# Patient Record
Sex: FEMALE | Race: WHITE | NOT HISPANIC OR LATINO | URBAN - METROPOLITAN AREA
[De-identification: names, ages, dates, MRNs, and addresses within clinical notes are randomized per-mention and may not be internally consistent; named-entity substitution may affect disease eponyms.]

---

## 2020-07-25 ENCOUNTER — TELEPHONE ENCOUNTER (OUTPATIENT)
Dept: URBAN - METROPOLITAN AREA CLINIC 13 | Facility: CLINIC | Age: 35
End: 2020-07-25

## 2020-07-25 RX ORDER — PANTOPRAZOLE SODIUM 40 MG/1
TAKE ONE TABLET BY MOUTH ONCE DAILY TABLET, DELAYED RELEASE ORAL
Qty: 30 | Refills: 3 | OUTPATIENT
Start: 2017-08-01 | End: 2018-03-21

## 2020-07-25 RX ORDER — HYOSCYAMINE SULFATE 0.12 MG/1
DISSOLVE 1 TABLET UNDER THE TONGUE EVERY 4 TO 6 HOURS AS NEEDED FOR ABDOMINAL PAIN TABLET, ORALLY DISINTEGRATING ORAL
Qty: 30 | Refills: 2 | OUTPATIENT
Start: 2017-07-21 | End: 2018-03-21

## 2020-07-25 RX ORDER — NORETHINDRONE ACETATE AND ETHINYL ESTRADIOL 1.5; 3 MG/1; UG/1
TAKE 1 TABLET DAILY AS DIRECTED TABLET ORAL
Refills: 0 | OUTPATIENT
Start: 2017-06-12 | End: 2017-07-21

## 2020-07-25 RX ORDER — NORETHINDRONE ACETATE, ETHINYL ESTRADIOL AND FERROUS FUMARATE 1MG-20(24)
TAKE 1 TABLET DAILY KIT ORAL
Refills: 0 | OUTPATIENT
End: 2018-03-21

## 2020-07-25 RX ORDER — BUSPIRONE HYDROCHLORIDE 5 MG/1
TAKE 1 TABLET TWICE DAILY TABLET ORAL
Qty: 60 | Refills: 11 | OUTPATIENT
Start: 2017-07-21 | End: 2017-08-04

## 2020-07-25 RX ORDER — POLYETHYLENE GLYCOL 3350, SODIUM SULFATE, SODIUM CHLORIDE, POTASSIUM CHLORIDE, ASCORBIC ACID, SODIUM ASCORBATE 7.5-2.691G
DRINK FIRST LITER OF SOLUTION AT 5:00 PM THE DAY PRIOR TO PROCEDURE. DRINK SECOND LITER 6 HOURS PRIOR TO PROCEDURE KIT ORAL
Qty: 1 | Refills: 0 | OUTPATIENT
Start: 2017-06-12 | End: 2017-07-07

## 2020-07-25 RX ORDER — ONDANSETRON HYDROCHLORIDE 4 MG/1
1 TABLET EVERY 4 HOURS AS NEEDED FOR NAUSEA TABLET, FILM COATED ORAL
Qty: 60 | Refills: 1 | OUTPATIENT
Start: 2017-07-21 | End: 2018-03-21

## 2020-07-25 RX ORDER — AMITRIPTYLINE HYDROCHLORIDE 10 MG/1
TAKE 1 TABLET BY MOUTH AT BEDTIME TABLET, FILM COATED ORAL
Qty: 30 | Refills: 5 | OUTPATIENT
Start: 2018-03-21 | End: 2019-05-21

## 2020-07-26 ENCOUNTER — TELEPHONE ENCOUNTER (OUTPATIENT)
Dept: URBAN - METROPOLITAN AREA CLINIC 13 | Facility: CLINIC | Age: 35
End: 2020-07-26

## 2020-07-26 RX ORDER — CEPHALEXIN 500 MG/1
TAKE ONE CAPSULE EVERY 6 HOURS FOR 10 DAYS CAPSULE ORAL
Qty: 40 | Refills: 0 | Status: ACTIVE | COMMUNITY
Start: 2018-09-10

## 2020-07-26 RX ORDER — BACLOFEN 10 MG/1
TAKE 1 TABLET BY MOUTH THREE TIMES A DAY AS NEEDED TABLET ORAL
Qty: 90 | Refills: 0 | Status: ACTIVE | COMMUNITY
Start: 2018-09-06

## 2020-07-26 RX ORDER — IPRATROPIUM BROMIDE 21 UG/1
USE 2 SPRAYS IN EACH NOSTRIL TWICE DAILY SPRAY, METERED NASAL
Refills: 0 | Status: ACTIVE | COMMUNITY
Start: 2019-05-10

## 2020-07-26 RX ORDER — POLYETHYLENE GLYCOL-3350 AND ELECTROLYTES 236; 6.74; 5.86; 2.97; 22.74 G/274.31G; G/274.31G; G/274.31G; G/274.31G; G/274.31G
POWDER, FOR SOLUTION ORAL
Qty: 4000 | Refills: 0 | Status: ACTIVE | COMMUNITY
Start: 2019-03-22

## 2020-07-26 RX ORDER — ALPRAZOLAM 0.5 MG/1
TAKE 1 TABLET NIGHTLY TABLET ORAL
Qty: 20 | Refills: 0 | Status: ACTIVE | COMMUNITY
Start: 2018-11-05

## 2020-07-26 RX ORDER — NORETHINDRONE ACETATE AND ETHINYL ESTRADIOL 1; 20 MG/1; UG/1
TAKE 1 TABLET DAILY AS DIRECTED TABLET ORAL
Refills: 0 | Status: ACTIVE | COMMUNITY

## 2020-07-26 RX ORDER — OXYCODONE AND ACETAMINOPHEN 5; 325 MG/1; MG/1
TAKE 1 TABLET BY MOUTH EVERY 4 TO 6 HOURS AS NEEDED FOR 14 DAYS TABLET ORAL
Qty: 84 | Refills: 0 | Status: ACTIVE | COMMUNITY
Start: 2018-09-17

## 2020-07-26 RX ORDER — DOXYCYCLINE 100 MG/1
CAPSULE ORAL
Qty: 20 | Refills: 0 | Status: ACTIVE | COMMUNITY
Start: 2019-01-28

## 2020-07-26 RX ORDER — FLUCONAZOLE 150 MG/1
TABLET ORAL
Qty: 2 | Refills: 0 | Status: ACTIVE | COMMUNITY
Start: 2019-01-28

## 2020-07-26 RX ORDER — METHYLPREDNISOLONE 4 MG/1
TABLET ORAL
Qty: 21 | Refills: 0 | Status: ACTIVE | COMMUNITY
Start: 2019-02-10

## 2020-07-26 RX ORDER — PROMETHAZINE HYDROCHLORIDE 12.5 MG/1
TAKE ONE TABLET EVERY 6 HOURS AS NEEDED NAUSEA/VOMITING TABLET ORAL
Qty: 10 | Refills: 0 | Status: ACTIVE | COMMUNITY
Start: 2018-09-06

## 2020-07-26 RX ORDER — CARISOPRODOL 350 MG/1
TABLET ORAL
Qty: 90 | Refills: 0 | Status: ACTIVE | COMMUNITY
Start: 2019-01-11

## 2020-07-26 RX ORDER — PREDNISONE 50 MG/1
TABLET ORAL
Qty: 3 | Refills: 0 | Status: ACTIVE | COMMUNITY
Start: 2019-03-22

## 2025-04-02 ENCOUNTER — OFFICE VISIT (OUTPATIENT)
Dept: URBAN - METROPOLITAN AREA CLINIC 113 | Facility: CLINIC | Age: 40
End: 2025-04-02
Payer: COMMERCIAL

## 2025-04-02 ENCOUNTER — DASHBOARD ENCOUNTERS (OUTPATIENT)
Age: 40
End: 2025-04-02

## 2025-04-02 DIAGNOSIS — R10.32 LLQ PAIN: ICD-10-CM

## 2025-04-02 DIAGNOSIS — Z86.0100 HISTORY OF COLON POLYPS: ICD-10-CM

## 2025-04-02 DIAGNOSIS — K64.8 INTERNAL HEMORRHOIDS: ICD-10-CM

## 2025-04-02 DIAGNOSIS — R19.7 CHRONIC DIARRHEA: ICD-10-CM

## 2025-04-02 PROCEDURE — 99204 OFFICE O/P NEW MOD 45 MIN: CPT

## 2025-04-02 PROCEDURE — 99244 OFF/OP CNSLTJ NEW/EST MOD 40: CPT

## 2025-04-02 RX ORDER — OXYCODONE AND ACETAMINOPHEN 5; 325 MG/1; MG/1
TAKE 1 TABLET BY MOUTH EVERY 4 TO 6 HOURS AS NEEDED FOR 14 DAYS TABLET ORAL
Qty: 84 | Refills: 0 | Status: ON HOLD | COMMUNITY
Start: 2018-09-17

## 2025-04-02 RX ORDER — NORETHINDRONE ACETATE AND ETHINYL ESTRADIOL 1; 20 MG/1; UG/1
TAKE 1 TABLET DAILY AS DIRECTED TABLET ORAL
Refills: 0 | Status: ON HOLD | COMMUNITY

## 2025-04-02 RX ORDER — BACLOFEN 10 MG/1
TAKE 1 TABLET BY MOUTH THREE TIMES A DAY AS NEEDED TABLET ORAL
Qty: 90 | Refills: 0 | Status: ON HOLD | COMMUNITY
Start: 2018-09-06

## 2025-04-02 RX ORDER — HYDROCORTISONE ACETATE 25 MG/1
1 SUPPOSITORY SUPPOSITORY RECTAL TWICE A DAY
Qty: 30 | Refills: 0 | OUTPATIENT
Start: 2025-04-02 | End: 2025-04-16

## 2025-04-02 RX ORDER — DICYCLOMINE HYDROCHLORIDE 10 MG/1
1 CAPSULE CAPSULE ORAL
Qty: 90 | Refills: 1 | OUTPATIENT
Start: 2025-04-02

## 2025-04-02 RX ORDER — PREDNISONE 50 MG/1
TABLET ORAL
Qty: 3 | Refills: 0 | Status: ON HOLD | COMMUNITY
Start: 2019-03-22

## 2025-04-02 RX ORDER — PROMETHAZINE HYDROCHLORIDE 12.5 MG/1
TAKE ONE TABLET EVERY 6 HOURS AS NEEDED NAUSEA/VOMITING TABLET ORAL
Qty: 10 | Refills: 0 | Status: ON HOLD | COMMUNITY
Start: 2018-09-06

## 2025-04-02 RX ORDER — ALPRAZOLAM 0.5 MG/1
TAKE 1 TABLET NIGHTLY TABLET ORAL
Qty: 20 | Refills: 0 | Status: ON HOLD | COMMUNITY
Start: 2018-11-05

## 2025-04-02 RX ORDER — CEPHALEXIN 500 MG/1
TAKE ONE CAPSULE EVERY 6 HOURS FOR 10 DAYS CAPSULE ORAL
Qty: 40 | Refills: 0 | Status: ON HOLD | COMMUNITY
Start: 2018-09-10

## 2025-04-02 RX ORDER — COLESTIPOL HYDROCHLORIDE 1 G/1
1 TABLET TABLET, FILM COATED ORAL TWICE DAILY
Qty: 180 | Refills: 2 | OUTPATIENT
Start: 2025-04-02

## 2025-04-02 RX ORDER — POLYETHYLENE GLYCOL-3350 AND ELECTROLYTES 236; 6.74; 5.86; 2.97; 22.74 G/274.31G; G/274.31G; G/274.31G; G/274.31G; G/274.31G
POWDER, FOR SOLUTION ORAL
Qty: 4000 | Refills: 0 | Status: ON HOLD | COMMUNITY
Start: 2019-03-22

## 2025-04-02 RX ORDER — PREDNISONE 50 MG/1
AS DIRECTED TABLET ORAL ONCE
Qty: 3 | Refills: 0 | OUTPATIENT
Start: 2025-04-02

## 2025-04-02 RX ORDER — METHYLPREDNISOLONE 4 MG/1
TABLET ORAL
Qty: 21 | Refills: 0 | Status: ON HOLD | COMMUNITY
Start: 2019-02-10

## 2025-04-02 RX ORDER — FLUCONAZOLE 150 MG/1
TABLET ORAL
Qty: 2 | Refills: 0 | Status: ON HOLD | COMMUNITY
Start: 2019-01-28

## 2025-04-02 RX ORDER — IPRATROPIUM BROMIDE 21 UG/1
USE 2 SPRAYS IN EACH NOSTRIL TWICE DAILY SPRAY, METERED NASAL
Refills: 0 | Status: ON HOLD | COMMUNITY
Start: 2019-05-10

## 2025-04-02 RX ORDER — DIPHENHYDRAMINE HYDROCHLORIDE 50 MG/1
1 CAPSULE CAPSULE ORAL ONCE
Qty: 1 | Refills: 0 | OUTPATIENT
Start: 2025-04-02 | End: 2025-04-03

## 2025-04-02 RX ORDER — CARISOPRODOL 350 MG/1
TABLET ORAL
Qty: 90 | Refills: 0 | Status: ON HOLD | COMMUNITY
Start: 2019-01-11

## 2025-04-02 RX ORDER — DOXYCYCLINE 100 MG/1
CAPSULE ORAL
Qty: 20 | Refills: 0 | Status: ON HOLD | COMMUNITY
Start: 2019-01-28

## 2025-04-02 NOTE — HPI-TODAY'S VISIT:
39-year-old female is referred by Dr. Christina Jerry for colonoscopy consultation and a history of ulcerative colitis.  A copy of today's visit will be forwarded to the referring provider. Labs done in October 2024 showed normal H/H, normal LFTs, normal vitamin D, normal T4, normal SALLY, negative RF, normal CRP. She is known to Dr. Spangler for a reported history of left-sided ulcerative colitis.  She underwent a colonoscopy in July 2017 which was completely normal.  Random colon biopsies were unremarkable.  She had chronic diarrhea likely functional due to her component abdominal pain.  She was recommended a trial of amitriptyline with consideration of Questran.  She also had a history of GERD, functional dyspepsia and nausea for which she was to continue pantoprazole 40 mg daily.  She was started on nifedipine/lidocaine ointment for her internal hemorrhoids and anal fissure along with a fiber supplement.  She was recommended hemorrhoid banding as well.  This was completed in 2019.  She recently moved back to [place]. She has had multiple back surgeries. She is a  and  and cannot currently work due to her back.   She states the hemorrhoid banding was very painful. A few years after this she had a pelvic and ovarian vein embolization from pelvic congestion syndrome. She was told it was a severe case. In 2018, she had a repeat colonoscopy in [place]. A polyp was removed. She was working in 2019 and seeing [facility]. She had another colonoscopy which was normal. She was told to repeat in 2024 for polyp surveillance. She reiterates that she was dx with UC as a teenager. Last October she stopped OCPs she lost a lot of weight since then and her GI symptoms have somewhat improved. She does have LLQ pain. She broke her tailbone this January and had severe constipation from pain meds and her hemorrhoid flared. SHe has tried OTC therapies for her hemorrhoids. Fiber does not work.

## 2025-04-17 ENCOUNTER — TELEPHONE ENCOUNTER (OUTPATIENT)
Dept: URBAN - METROPOLITAN AREA CLINIC 113 | Facility: CLINIC | Age: 40
End: 2025-04-17

## 2025-04-29 ENCOUNTER — ERX REFILL RESPONSE (OUTPATIENT)
Dept: URBAN - METROPOLITAN AREA CLINIC 113 | Facility: CLINIC | Age: 40
End: 2025-04-29

## 2025-04-29 RX ORDER — DICYCLOMINE HYDROCHLORIDE 10 MG/1
1 CAPSULE CAPSULE ORAL
Qty: 270 | Refills: 1 | OUTPATIENT

## 2025-04-29 RX ORDER — DICYCLOMINE HYDROCHLORIDE 10 MG/1
1 CAPSULE CAPSULE ORAL
Qty: 90 | Refills: 1 | OUTPATIENT

## 2025-06-02 ENCOUNTER — OFFICE VISIT (OUTPATIENT)
Dept: URBAN - METROPOLITAN AREA CLINIC 113 | Facility: CLINIC | Age: 40
End: 2025-06-02
Payer: COMMERCIAL

## 2025-06-02 DIAGNOSIS — Z86.0100 HISTORY OF COLON POLYPS: ICD-10-CM

## 2025-06-02 DIAGNOSIS — R10.32 LLQ PAIN: ICD-10-CM

## 2025-06-02 DIAGNOSIS — R19.7 ACUTE DIARRHEA: ICD-10-CM

## 2025-06-02 DIAGNOSIS — R63.4 WEIGHT LOSS: ICD-10-CM

## 2025-06-02 DIAGNOSIS — K64.8 INTERNAL HEMORRHOIDS: ICD-10-CM

## 2025-06-02 PROCEDURE — 99214 OFFICE O/P EST MOD 30 MIN: CPT

## 2025-06-02 RX ORDER — METHYLPREDNISOLONE 4 MG/1
TABLET ORAL
Qty: 21 | Refills: 0 | Status: ON HOLD | COMMUNITY
Start: 2019-02-10

## 2025-06-02 RX ORDER — NORETHINDRONE ACETATE AND ETHINYL ESTRADIOL 1; 20 MG/1; UG/1
TAKE 1 TABLET DAILY AS DIRECTED TABLET ORAL
Refills: 0 | Status: ON HOLD | COMMUNITY

## 2025-06-02 RX ORDER — CARISOPRODOL 350 MG/1
TABLET ORAL
Qty: 90 | Refills: 0 | Status: ON HOLD | COMMUNITY
Start: 2019-01-11

## 2025-06-02 RX ORDER — DICYCLOMINE HYDROCHLORIDE 10 MG/1
1 CAPSULE CAPSULE ORAL
Qty: 270 | Refills: 1 | Status: ACTIVE | COMMUNITY

## 2025-06-02 RX ORDER — PROMETHAZINE HYDROCHLORIDE 12.5 MG/1
TAKE ONE TABLET EVERY 6 HOURS AS NEEDED NAUSEA/VOMITING TABLET ORAL
Qty: 10 | Refills: 0 | Status: ON HOLD | COMMUNITY
Start: 2018-09-06

## 2025-06-02 RX ORDER — PREDNISONE 50 MG/1
AS DIRECTED TABLET ORAL ONCE
Qty: 3 | Refills: 0 | Status: ACTIVE | COMMUNITY
Start: 2025-04-02

## 2025-06-02 RX ORDER — IPRATROPIUM BROMIDE 21 UG/1
USE 2 SPRAYS IN EACH NOSTRIL TWICE DAILY SPRAY, METERED NASAL
Refills: 0 | Status: ON HOLD | COMMUNITY
Start: 2019-05-10

## 2025-06-02 RX ORDER — BACLOFEN 10 MG/1
TAKE 1 TABLET BY MOUTH THREE TIMES A DAY AS NEEDED TABLET ORAL
Qty: 90 | Refills: 0 | Status: ON HOLD | COMMUNITY
Start: 2018-09-06

## 2025-06-02 RX ORDER — DOXYCYCLINE 100 MG/1
CAPSULE ORAL
Qty: 20 | Refills: 0 | Status: ON HOLD | COMMUNITY
Start: 2019-01-28

## 2025-06-02 RX ORDER — OXYCODONE AND ACETAMINOPHEN 5; 325 MG/1; MG/1
TAKE 1 TABLET BY MOUTH EVERY 4 TO 6 HOURS AS NEEDED FOR 14 DAYS TABLET ORAL
Qty: 84 | Refills: 0 | Status: ON HOLD | COMMUNITY
Start: 2018-09-17

## 2025-06-02 RX ORDER — PREDNISONE 50 MG/1
TABLET ORAL
Qty: 3 | Refills: 0 | Status: ON HOLD | COMMUNITY
Start: 2019-03-22

## 2025-06-02 RX ORDER — FLUCONAZOLE 150 MG/1
TABLET ORAL
Qty: 2 | Refills: 0 | Status: ON HOLD | COMMUNITY
Start: 2019-01-28

## 2025-06-02 RX ORDER — COLESTIPOL HYDROCHLORIDE 1 G/1
1 TABLET TABLET, FILM COATED ORAL TWICE DAILY
Qty: 180 | Refills: 2 | Status: ACTIVE | COMMUNITY
Start: 2025-04-02

## 2025-06-02 RX ORDER — ALPRAZOLAM 0.5 MG/1
TAKE 1 TABLET NIGHTLY TABLET ORAL
Qty: 20 | Refills: 0 | Status: ON HOLD | COMMUNITY
Start: 2018-11-05

## 2025-06-02 RX ORDER — CEPHALEXIN 500 MG/1
TAKE ONE CAPSULE EVERY 6 HOURS FOR 10 DAYS CAPSULE ORAL
Qty: 40 | Refills: 0 | Status: ON HOLD | COMMUNITY
Start: 2018-09-10

## 2025-06-02 RX ORDER — POLYETHYLENE GLYCOL-3350 AND ELECTROLYTES 236; 6.74; 5.86; 2.97; 22.74 G/274.31G; G/274.31G; G/274.31G; G/274.31G; G/274.31G
POWDER, FOR SOLUTION ORAL
Qty: 4000 | Refills: 0 | Status: ON HOLD | COMMUNITY
Start: 2019-03-22

## 2025-06-02 NOTE — HPI-TODAY'S VISIT:
39-year-old female presents for follow-up.  She was last seen on/2/25.  She had a reported history of ulcerative colitis but had had multiple normal colonoscopies.  Her diarrhea was suspected to be due to irritable bowel syndrome.  She was started on colestipol 1 to 2 tablets daily and Bentyl as needed.  She did report left lower quadrant pain.  She was recommended a CT scan and was provided a premed kit due to contrast allergy.  She has a history of internal hemorrhoids status post banding in 2019 which was painful per patient.  She was provided suppositories.  She is due for a surveillance colonoscopy due to history of colon polyps.  This was deferred until her hemorrhoids were under control.  The order was faxed to [facility].  We do not have records for review.  She states the colestipol is hard to swallow. She had a steroid injection, steroids for her CT imaging. She hasnt had much diarrhea since then. The colestipol causes nausea and abdominal pain.   She had her CT at [facility]. She states the GI findings were fine. She is planned for a LE nerve conduction study this week. She will develop diarrhea if she consumes fruits and vegetables. She has had more constipation predominant bowel habits of late. She still endorses that eating causes lower abdominal pain. She feels her LLQ pain is due to her pelvic congestion syndrome.

## 2025-07-28 ENCOUNTER — OFFICE VISIT (OUTPATIENT)
Dept: URBAN - METROPOLITAN AREA SURGERY CENTER 25 | Facility: SURGERY CENTER | Age: 40
End: 2025-07-28

## 2025-07-28 ENCOUNTER — CLAIMS CREATED FROM THE CLAIM WINDOW (OUTPATIENT)
Dept: URBAN - METROPOLITAN AREA CLINIC 4 | Facility: CLINIC | Age: 40
End: 2025-07-28
Payer: COMMERCIAL

## 2025-07-28 DIAGNOSIS — K63.89 OTHER SPECIFIED DISEASES OF INTESTINE: ICD-10-CM

## 2025-07-28 PROCEDURE — 88342 IMHCHEM/IMCYTCHM 1ST ANTB: CPT | Performed by: PATHOLOGY

## 2025-07-28 PROCEDURE — 88313 SPECIAL STAINS GROUP 2: CPT | Performed by: PATHOLOGY

## 2025-07-28 PROCEDURE — 88305 TISSUE EXAM BY PATHOLOGIST: CPT | Performed by: PATHOLOGY

## 2025-07-28 RX ORDER — BACLOFEN 10 MG/1
TAKE 1 TABLET BY MOUTH THREE TIMES A DAY AS NEEDED TABLET ORAL
Qty: 90 | Refills: 0 | Status: ON HOLD | COMMUNITY
Start: 2018-09-06

## 2025-07-28 RX ORDER — NORETHINDRONE ACETATE AND ETHINYL ESTRADIOL 1; 20 MG/1; UG/1
TAKE 1 TABLET DAILY AS DIRECTED TABLET ORAL
Refills: 0 | Status: ON HOLD | COMMUNITY

## 2025-07-28 RX ORDER — PREDNISONE 50 MG/1
TABLET ORAL
Qty: 3 | Refills: 0 | Status: ON HOLD | COMMUNITY
Start: 2019-03-22

## 2025-07-28 RX ORDER — COLESTIPOL HYDROCHLORIDE 1 G/1
1 TABLET TABLET, FILM COATED ORAL TWICE DAILY
Qty: 180 | Refills: 2 | Status: ACTIVE | COMMUNITY
Start: 2025-04-02

## 2025-07-28 RX ORDER — PROMETHAZINE HYDROCHLORIDE 12.5 MG/1
TAKE ONE TABLET EVERY 6 HOURS AS NEEDED NAUSEA/VOMITING TABLET ORAL
Qty: 10 | Refills: 0 | Status: ON HOLD | COMMUNITY
Start: 2018-09-06

## 2025-07-28 RX ORDER — OXYCODONE AND ACETAMINOPHEN 5; 325 MG/1; MG/1
TAKE 1 TABLET BY MOUTH EVERY 4 TO 6 HOURS AS NEEDED FOR 14 DAYS TABLET ORAL
Qty: 84 | Refills: 0 | Status: ON HOLD | COMMUNITY
Start: 2018-09-17

## 2025-07-28 RX ORDER — CEPHALEXIN 500 MG/1
TAKE ONE CAPSULE EVERY 6 HOURS FOR 10 DAYS CAPSULE ORAL
Qty: 40 | Refills: 0 | Status: ON HOLD | COMMUNITY
Start: 2018-09-10

## 2025-07-28 RX ORDER — CARISOPRODOL 350 MG/1
TABLET ORAL
Qty: 90 | Refills: 0 | Status: ON HOLD | COMMUNITY
Start: 2019-01-11

## 2025-07-28 RX ORDER — ALPRAZOLAM 0.5 MG/1
TAKE 1 TABLET NIGHTLY TABLET ORAL
Qty: 20 | Refills: 0 | Status: ON HOLD | COMMUNITY
Start: 2018-11-05

## 2025-07-28 RX ORDER — POLYETHYLENE GLYCOL-3350 AND ELECTROLYTES 236; 6.74; 5.86; 2.97; 22.74 G/274.31G; G/274.31G; G/274.31G; G/274.31G; G/274.31G
POWDER, FOR SOLUTION ORAL
Qty: 4000 | Refills: 0 | Status: ON HOLD | COMMUNITY
Start: 2019-03-22

## 2025-07-28 RX ORDER — DOXYCYCLINE 100 MG/1
CAPSULE ORAL
Qty: 20 | Refills: 0 | Status: ON HOLD | COMMUNITY
Start: 2019-01-28

## 2025-07-28 RX ORDER — PREDNISONE 50 MG/1
AS DIRECTED TABLET ORAL ONCE
Qty: 3 | Refills: 0 | Status: ACTIVE | COMMUNITY
Start: 2025-04-02

## 2025-07-28 RX ORDER — IPRATROPIUM BROMIDE 21 UG/1
USE 2 SPRAYS IN EACH NOSTRIL TWICE DAILY SPRAY, METERED NASAL
Refills: 0 | Status: ON HOLD | COMMUNITY
Start: 2019-05-10

## 2025-07-28 RX ORDER — FLUCONAZOLE 150 MG/1
TABLET ORAL
Qty: 2 | Refills: 0 | Status: ON HOLD | COMMUNITY
Start: 2019-01-28

## 2025-07-28 RX ORDER — METHYLPREDNISOLONE 4 MG/1
TABLET ORAL
Qty: 21 | Refills: 0 | Status: ON HOLD | COMMUNITY
Start: 2019-02-10

## 2025-07-28 RX ORDER — DICYCLOMINE HYDROCHLORIDE 10 MG/1
1 CAPSULE CAPSULE ORAL
Qty: 270 | Refills: 1 | Status: ACTIVE | COMMUNITY

## 2025-08-08 ENCOUNTER — OFFICE VISIT (OUTPATIENT)
Dept: URBAN - METROPOLITAN AREA CLINIC 113 | Facility: CLINIC | Age: 40
End: 2025-08-08
Payer: COMMERCIAL

## 2025-08-08 DIAGNOSIS — R63.4 WEIGHT LOSS: ICD-10-CM

## 2025-08-08 DIAGNOSIS — K64.8 INTERNAL HEMORRHOIDS: ICD-10-CM

## 2025-08-08 DIAGNOSIS — K55.1 MESENTERIC ARTERY STENOSIS: ICD-10-CM

## 2025-08-08 DIAGNOSIS — R19.7 ACUTE DIARRHEA: ICD-10-CM

## 2025-08-08 DIAGNOSIS — R10.32 LLQ PAIN: ICD-10-CM

## 2025-08-08 DIAGNOSIS — Z86.0100 HISTORY OF COLON POLYPS: ICD-10-CM

## 2025-08-08 PROCEDURE — 99213 OFFICE O/P EST LOW 20 MIN: CPT | Performed by: INTERNAL MEDICINE

## 2025-08-08 RX ORDER — ALPRAZOLAM 0.5 MG/1
TAKE 1 TABLET NIGHTLY TABLET ORAL
Qty: 20 | Refills: 0 | Status: ON HOLD | COMMUNITY
Start: 2018-11-05

## 2025-08-08 RX ORDER — NORETHINDRONE ACETATE AND ETHINYL ESTRADIOL 1; 20 MG/1; UG/1
TAKE 1 TABLET DAILY AS DIRECTED TABLET ORAL
Refills: 0 | Status: ON HOLD | COMMUNITY

## 2025-08-08 RX ORDER — CARISOPRODOL 350 MG/1
TABLET ORAL
Qty: 90 | Refills: 0 | Status: ON HOLD | COMMUNITY
Start: 2019-01-11

## 2025-08-08 RX ORDER — FLUCONAZOLE 150 MG/1
TABLET ORAL
Qty: 2 | Refills: 0 | Status: ON HOLD | COMMUNITY
Start: 2019-01-28

## 2025-08-08 RX ORDER — BACLOFEN 10 MG/1
TAKE 1 TABLET BY MOUTH THREE TIMES A DAY AS NEEDED TABLET ORAL
Qty: 90 | Refills: 0 | Status: ON HOLD | COMMUNITY
Start: 2018-09-06

## 2025-08-08 RX ORDER — DICYCLOMINE HYDROCHLORIDE 10 MG/1
1 CAPSULE CAPSULE ORAL
Qty: 270 | Refills: 1 | Status: ON HOLD | COMMUNITY

## 2025-08-08 RX ORDER — DOXYCYCLINE 100 MG/1
CAPSULE ORAL
Qty: 20 | Refills: 0 | Status: ON HOLD | COMMUNITY
Start: 2019-01-28

## 2025-08-08 RX ORDER — CEPHALEXIN 500 MG/1
TAKE ONE CAPSULE EVERY 6 HOURS FOR 10 DAYS CAPSULE ORAL
Qty: 40 | Refills: 0 | Status: ON HOLD | COMMUNITY
Start: 2018-09-10

## 2025-08-08 RX ORDER — PROMETHAZINE HYDROCHLORIDE 12.5 MG/1
TAKE ONE TABLET EVERY 6 HOURS AS NEEDED NAUSEA/VOMITING TABLET ORAL
Qty: 10 | Refills: 0 | Status: ON HOLD | COMMUNITY
Start: 2018-09-06

## 2025-08-08 RX ORDER — PREDNISONE 50 MG/1
AS DIRECTED TABLET ORAL ONCE
Qty: 3 | Refills: 0 | Status: ON HOLD | COMMUNITY
Start: 2025-04-02

## 2025-08-08 RX ORDER — PREDNISONE 50 MG/1
1 TABLET WITH FOOD OR MILK TABLET ORAL ONCE A DAY
Qty: 5 | OUTPATIENT
Start: 2025-08-08

## 2025-08-08 RX ORDER — PREDNISONE 50 MG/1
TABLET ORAL
Qty: 3 | Refills: 0 | Status: ON HOLD | COMMUNITY
Start: 2019-03-22

## 2025-08-08 RX ORDER — OXYCODONE AND ACETAMINOPHEN 5; 325 MG/1; MG/1
TAKE 1 TABLET BY MOUTH EVERY 4 TO 6 HOURS AS NEEDED FOR 14 DAYS TABLET ORAL
Qty: 84 | Refills: 0 | Status: ON HOLD | COMMUNITY
Start: 2018-09-17

## 2025-08-08 RX ORDER — METHYLPREDNISOLONE 4 MG/1
TABLET ORAL
Qty: 21 | Refills: 0 | Status: ON HOLD | COMMUNITY
Start: 2019-02-10

## 2025-08-08 RX ORDER — COLESTIPOL HYDROCHLORIDE 1 G/1
1 TABLET TABLET, FILM COATED ORAL TWICE DAILY
Qty: 180 | Refills: 2 | Status: ON HOLD | COMMUNITY
Start: 2025-04-02

## 2025-08-08 RX ORDER — POLYETHYLENE GLYCOL-3350 AND ELECTROLYTES 236; 6.74; 5.86; 2.97; 22.74 G/274.31G; G/274.31G; G/274.31G; G/274.31G; G/274.31G
POWDER, FOR SOLUTION ORAL
Qty: 4000 | Refills: 0 | Status: ON HOLD | COMMUNITY
Start: 2019-03-22

## 2025-08-08 RX ORDER — IPRATROPIUM BROMIDE 21 UG/1
USE 2 SPRAYS IN EACH NOSTRIL TWICE DAILY SPRAY, METERED NASAL
Refills: 0 | Status: ON HOLD | COMMUNITY
Start: 2019-05-10

## 2025-08-19 ENCOUNTER — OFFICE VISIT (OUTPATIENT)
Dept: URBAN - METROPOLITAN AREA CLINIC 113 | Facility: CLINIC | Age: 40
End: 2025-08-19

## 2025-08-25 ENCOUNTER — OFFICE VISIT (OUTPATIENT)
Dept: URBAN - METROPOLITAN AREA CLINIC 113 | Facility: CLINIC | Age: 40
End: 2025-08-25